# Patient Record
Sex: FEMALE
[De-identification: names, ages, dates, MRNs, and addresses within clinical notes are randomized per-mention and may not be internally consistent; named-entity substitution may affect disease eponyms.]

---

## 2017-08-01 ENCOUNTER — CHARTING TRANS (OUTPATIENT)
Dept: OTHER | Age: 27
End: 2017-08-01

## 2017-08-01 ENCOUNTER — LAB SERVICES (OUTPATIENT)
Dept: OTHER | Age: 27
End: 2017-08-01

## 2017-08-01 LAB
BILIRUBIN: NORMAL
LEUKOCYTES: NORMAL
NITRITE: NORMAL
OCCULT BLOOD: NORMAL
PH: 5
PROTEIN: 30
URINE SPEC GRAVITY: 1.02
UROBILINOGEN: 0.2

## 2018-11-03 VITALS
TEMPERATURE: 98.6 F | BODY MASS INDEX: 41.26 KG/M2 | HEART RATE: 82 BPM | OXYGEN SATURATION: 98 % | HEIGHT: 66 IN | DIASTOLIC BLOOD PRESSURE: 78 MMHG | WEIGHT: 256.72 LBS | SYSTOLIC BLOOD PRESSURE: 122 MMHG | RESPIRATION RATE: 18 BRPM

## 2019-01-07 ENCOUNTER — OFFICE VISIT (OUTPATIENT)
Dept: FAMILY MEDICINE CLINIC | Age: 29
End: 2019-01-07

## 2019-01-07 VITALS
SYSTOLIC BLOOD PRESSURE: 120 MMHG | WEIGHT: 239.4 LBS | HEART RATE: 81 BPM | BODY MASS INDEX: 38.47 KG/M2 | DIASTOLIC BLOOD PRESSURE: 77 MMHG | OXYGEN SATURATION: 100 % | HEIGHT: 66 IN | TEMPERATURE: 97.2 F | RESPIRATION RATE: 16 BRPM

## 2019-01-07 DIAGNOSIS — J30.89 NON-SEASONAL ALLERGIC RHINITIS DUE TO OTHER ALLERGIC TRIGGER: Primary | ICD-10-CM

## 2019-01-07 DIAGNOSIS — E66.9 OBESITY (BMI 35.0-39.9 WITHOUT COMORBIDITY): ICD-10-CM

## 2019-01-07 PROBLEM — E66.01 SEVERE OBESITY (HCC): Status: ACTIVE | Noted: 2019-01-07

## 2019-01-07 RX ORDER — CETIRIZINE HCL 10 MG
10 TABLET ORAL DAILY
Qty: 30 TAB | Refills: 3 | Status: SHIPPED | OUTPATIENT
Start: 2019-01-07 | End: 2019-04-08

## 2019-01-07 NOTE — PROGRESS NOTES
Chief Complaint   Patient presents with   [address] [place] [address]     1. Have you been to the ER, urgent care clinic since your last visit? Hospitalized since your last visit? No.    2. Have you seen or consulted any other health care providers outside of the [address] [place] [address] since your last visit? Include any pap smears or colon screening. No.     Last PAP was on May 29, 2018. PAP performed by Dr. Noris Marquez.     Visit Vitals  /77   Pulse 81   Temp 97.2 °F (36.2 °C) (Oral)   Resp 16   Ht 5' 6\" (1.676 m)   Wt 239 lb 6.4 oz (108.6 kg)   SpO2 100%   BMI 38.64 kg/m²

## 2019-01-07 NOTE — PATIENT INSTRUCTIONS
Allergies: Care Instructions  Your Care Instructions    Allergies occur when your body's defense system (immune system) overreacts to certain substances. The immune system treats a harmless substance as if it were a harmful germ or virus. Many things can cause this overreaction, including pollens, medicine, food, dust, animal dander, and mold. Allergies can be mild or severe. Mild allergies can be managed with home treatment. But medicine may be needed to prevent problems. Managing your allergies is an important part of staying healthy. Your doctor may suggest that you have allergy testing to help find out what is causing your allergies. When you know what things trigger your symptoms, you can avoid them. This can prevent allergy symptoms and other health problems. For severe allergies that cause reactions that affect your whole body (anaphylactic reactions), your doctor may prescribe a shot of epinephrine to carry with you in case you have a severe reaction. Learn how to give yourself the shot and keep it with you at all times. Make sure it is not . Follow-up care is a key part of your treatment and safety. Be sure to make and go to all appointments, and call your doctor if you are having problems. It's also a good idea to know your test results and keep a list of the medicines you take. How can you care for yourself at home? · If you have been told by your doctor that dust or dust mites are causing your allergy, decrease the dust around your bed:  ? Wash sheets, pillowcases, and other bedding in hot water every week. ? Use dust-proof covers for pillows, duvets, and mattresses. Avoid plastic covers because they tear easily and do not \"breathe. \" Wash as instructed on the label. ? Do not use any blankets and pillows that you do not need. ? Use blankets that you can wash in your washing machine. ? Consider removing drapes and carpets, which attract and hold dust, from your bedroom.   · If you are allergic to house dust and mites, do not use home humidifiers. Your doctor can suggest ways you can control dust and mites. · Look for signs of cockroaches. Cockroaches cause allergic reactions. Use cockroach baits to get rid of them. Then, clean your home well. Cockroaches like areas where grocery bags, newspapers, empty bottles, or cardboard boxes are stored. Do not keep these inside your home, and keep trash and food containers sealed. Seal off any spots where cockroaches might enter your home. · If you are allergic to mold, get rid of furniture, rugs, and drapes that smell musty. Check for mold in the bathroom. · If you are allergic to outdoor pollen or mold spores, use air-conditioning. Change or clean all filters every month. Keep windows closed. · If you are allergic to pollen, stay inside when pollen counts are high. Use a vacuum  with a HEPA filter or a double-thickness filter at least two times each week. · Stay inside when air pollution is bad. Avoid paint fumes, perfumes, and other strong odors. · Avoid conditions that make your allergies worse. Stay away from smoke. Do not smoke or let anyone else smoke in your house. Do not use fireplaces or wood-burning stoves. · If you are allergic to your pets, change the air filter in your furnace every month. Use high-efficiency filters. · If you are allergic to pet dander, keep pets outside or out of your bedroom. Old carpet and cloth furniture can hold a lot of animal dander. You may need to replace them. When should you call for help? Give an epinephrine shot if:    · You think you are having a severe allergic reaction.     · You have symptoms in more than one body area, such as mild nausea and an itchy mouth.    After giving an epinephrine shot call 911, even if you feel better.   Call 911 if:    · You have symptoms of a severe allergic reaction. These may include:  ? Sudden raised, red areas (hives) all over your body. ?  Swelling of the throat, mouth, lips, or tongue. ? Trouble breathing. ? Passing out (losing consciousness). Or you may feel very lightheaded or suddenly feel weak, confused, or restless.     · You have been given an epinephrine shot, even if you feel better.    Call your doctor now or seek immediate medical care if:    · You have symptoms of an allergic reaction, such as:  ? A rash or hives (raised, red areas on the skin). ? Itching. ? Swelling. ? Belly pain, nausea, or vomiting.    Watch closely for changes in your health, and be sure to contact your doctor if:    · You do not get better as expected. Where can you learn more? Go to http://moiz-shaun.info/. Enter F994 in the search box to learn more about \"Allergies: Care Instructions. \"  Current as of: June 28, 2018  Content Version: 11.8  © 9879-4440 [employer]. Care instructions adapted under license by [employer] (which disclaims liability or warranty for this information). If you have questions about a medical condition or this instruction, always ask your healthcare professional. [place] [address] any warranty or liability for your use of this information. Polycystic Ovary Syndrome: Care Instructions  Your Care Instructions  Polycystic ovary syndrome, or PCOS, means a woman's hormones are out of balance. It can cause problems with your periods and make it hard to get pregnant. Doctors don't know for sure what causes PCOS, but it seems to run in families. It also seems to be linked to obesity and a risk for diabetes. If you have PCOS, your sisters and daughters have a higher chance of getting it too. You may have other symptoms. These include weight gain, acne, too much hair growth on the face or body, high blood pressure, and high blood sugar. Your ovaries may have cysts on them. These cysts are growths filled with fluid.   Keep in mind that although you may not have regular periods, you can still get pregnant. Talk to your doctor about birth control if you do not want to get pregnant. Sometimes the hormone changes with PCOS can also make it hard for some women to get pregnant. If this is a concern, talk to your doctor about treatment for this problem. Women who have PCOS can go for months or longer with no period. Your doctor may recommend medicines that can help get your cycles back to normal.  Follow-up care is a key part of your treatment and safety. Be sure to make and go to all appointments, and call your doctor if you are having problems. It's also a good idea to know your test results and keep a list of the medicines you take. How can you care for yourself at home? · Take your medicines exactly as prescribed. Call your doctor if you think you are having a problem with your medicine. · Eat a healthy diet. Include fruits, vegetables, beans, and whole grains in your diet each day. · If you are overweight, losing weight can help with many of the symptoms of PCOS. Talk to your doctor about safe ways to lose weight. · Get at least 30 minutes of exercise on most days of the week. Walking is a good choice. Or you can run, swim, cycle, or play tennis or team sports. · For hair growth you don't want, try bleaching, plucking, electrolysis, or laser therapy. · Acne can be treated with over-the-counter medicines. Look for ones that have benzoyl peroxide or salicylic acid in them. When should you call for help? Call your doctor now or seek immediate medical care if:    · You have severe vaginal bleeding.     · You have new or worse belly or pelvic pain.    Watch closely for changes in your health, and be sure to contact your doctor if:    · You do not get better as expected.     · You have unusual vaginal bleeding. Where can you learn more? Go to http://moiz-shaun.info/. Enter C831 in the search box to learn more about \"Polycystic Ovary Syndrome: Care Instructions. \"  Current as of: May 15, 2018  Content Version: 11.8  © 9816-3272 [employer]. Care instructions adapted under license by [employer] (which disclaims liability or warranty for this information). If you have questions about a medical condition or this instruction, always ask your healthcare professional. [address] any warranty or liability for your use of this information.

## 2019-01-07 NOTE — PROGRESS NOTES
Chief Complaint   Patient presents with    Establish Care       HPI:    This is a  33 y/o female  patient who presents to [facility] . Today she complain of itching watery eye, running nose and occasional cough. Patient concerned about weight. She is working on loosing weight. BMI >38    ROS:  Pt denies: Wt loss, Fever/Chills, HA, Visual changes, Fatigue, Chest pain, SOB, FREEDMAN, Abd pain, N/V/D/C, Blood in stool or urine, Edema. Pertinent positive as above in HPI. All others were negative          Past Medical History:   Diagnosis Date    Anxiety     Depression     Herpes simplex type 1 infection      Past Surgical History:   Procedure Laterality Date    HX HERNIA REPAIR      HX WISDOM TEETH EXTRACTION         Family History   Problem Relation Age of Onset    Hypertension Mother    Golden.Alisa Cataract Mother     Schizophrenia Mother     Diabetes Father    Golden.Alisa Elevated Lipids Father     Hypertension Father     Cancer Paternal Grandmother         stomach    Diabetes Sister        Social History     Socioeconomic History    Marital status: SINGLE     Spouse name: Not on file    Number of children: Not on file    Years of education: Not on file    Highest education level: Not on file   Tobacco Use    Smoking status: Never Smoker    Smokeless tobacco: Never Used   Substance and Sexual Activity    Alcohol use: Yes     Comment: Social    Drug use: No    Sexual activity: Yes     Partners: Male     Current Outpatient Medications   Medication Sig Dispense Refill    ergocalciferol (ERGOCALCIFEROL) 50,000 unit capsule Take 1 Cap by mouth every seven (7) days. 12 Cap 1    cetirizine (ZYRTEC) 10 mg tablet Take 1 Tab by mouth daily.  30 Tab 3       No Known Allergies          Physical Exam:    Vital Signs:   Visit Vitals  /77   Pulse 81   Temp 97.2 °F (36.2 °C) (Oral)   Resp 16   Ht 5' 6\" (1.676 m)   Wt 239 lb 6.4 oz (108.6 kg)   LMP 01/01/2019 (Exact Date)   SpO2 100%   BMI 38.64 kg/m²         General: a, a & o x 3, afebrile,  interacting appropriately, in no acute distress  HEENT: head normocephalic and atraumatic, conjuctiva clear  Skin: warm and dry, no rashes , no bruises, no skin lesions  Neck: supple, symmetrical, no palpable mass, no thyromegaly  Respiratory: lung sounds clear bilaterally, good air entry, good respiratory effort, no wheezes or crackles  Cardiovascular: normal S1S2, regular rate and rhythm, no murmurs  Abdomen: non-distended, normoactive bowel sounds x 4 quadrants, soft, non-tender to palpation, no guarding or rigidity, no hepatomegaly, no palpable mass  Musculoskeletal: normal ROM on all joints, no swelling or deformity  Psychiatric: a, a & o x 3, appropriate mood and affect, no thought disorder    Assessment/Plan:      ICD-10-CM ICD-9-CM    1. Non-seasonal allergic rhinitis due to other allergic trigger J30.89 477.8 cetirizine (ZYRTEC) 10 mg tablet      REFERRAL TO ALLERGY   2. Obesity (BMI 35.0-39.9 without comorbidity) E66.9 278.00 CBC WITH AUTOMATED DIFF    I have reviewed/discussed the above normal BMI with the patient. I have recommended the following interventions: dietary management education, guidance, and counseling, encourage exercise and monitor weight . Annamary Ripa TSH 3RD GENERATION      LIPID PANEL      VITAMIN D, 25 HYDROXY      METABOLIC PANEL, COMPREHENSIVE      T4, FREE      HEMOGLOBIN A1C WITH EAG      HEMOGLOBIN A1C WITH EAG      T4, FREE      METABOLIC PANEL, COMPREHENSIVE      VITAMIN D, 25 HYDROXY      LIPID PANEL      TSH 3RD GENERATION      CBC WITH AUTOMATED DIFF           Additional Notes: Discussed today's diagnosis, treatment plans. Discussed medication indications and side effects. After Visit Summary: Provided and discussed printed patient instructions. Answered questions in relation to today's diagnosis.   Follow-up Disposition: 2 weeks follow up weight and lab review        Maryanne Rodas, NP-BC  [address] [facility] [place]            Orders Placed This Encounter    CBC WITH AUTOMATED DIFF    TSH 3RD GENERATION    LIPID PANEL    VITAMIN D, 25 HYDROXY    METABOLIC PANEL, COMPREHENSIVE    T4, FREE    HEMOGLOBIN A1C WITH EAG    CVD REPORT    REFERRAL TO ALLERGY    cetirizine (ZYRTEC) 10 mg tablet    ergocalciferol (ERGOCALCIFEROL) 50,000 unit capsule

## 2019-01-08 LAB
25(OH)D3+25(OH)D2 SERPL-MCNC: 13.9 NG/ML (ref 30–100)
ALBUMIN SERPL-MCNC: 4.6 G/DL (ref 3.5–5.5)
ALBUMIN/GLOB SERPL: 1.6 {RATIO} (ref 1.2–2.2)
ALP SERPL-CCNC: 104 IU/L (ref 39–117)
ALT SERPL-CCNC: 12 IU/L (ref 0–32)
AST SERPL-CCNC: 12 IU/L (ref 0–40)
BASOPHILS # BLD AUTO: 0 X10E3/UL (ref 0–0.2)
BASOPHILS NFR BLD AUTO: 0 %
BILIRUB SERPL-MCNC: <0.2 MG/DL (ref 0–1.2)
BUN SERPL-MCNC: 12 MG/DL (ref 6–20)
BUN/CREAT SERPL: 17 (ref 9–23)
CALCIUM SERPL-MCNC: 8.9 MG/DL (ref 8.7–10.2)
CHLORIDE SERPL-SCNC: 105 MMOL/L (ref 96–106)
CHOLEST SERPL-MCNC: 206 MG/DL (ref 100–199)
CO2 SERPL-SCNC: 19 MMOL/L (ref 20–29)
CREAT SERPL-MCNC: 0.69 MG/DL (ref 0.57–1)
EOSINOPHIL # BLD AUTO: 0.1 X10E3/UL (ref 0–0.4)
EOSINOPHIL NFR BLD AUTO: 1 %
ERYTHROCYTE [DISTWIDTH] IN BLOOD BY AUTOMATED COUNT: 14.8 % (ref 12.3–15.4)
EST. AVERAGE GLUCOSE BLD GHB EST-MCNC: 108 MG/DL
GLOBULIN SER CALC-MCNC: 2.8 G/DL (ref 1.5–4.5)
GLUCOSE SERPL-MCNC: 88 MG/DL (ref 65–99)
HBA1C MFR BLD: 5.4 % (ref 4.8–5.6)
HCT VFR BLD AUTO: 40.6 % (ref 34–46.6)
HDLC SERPL-MCNC: 40 MG/DL
HGB BLD-MCNC: 12.9 G/DL (ref 11.1–15.9)
IMM GRANULOCYTES # BLD AUTO: 0 X10E3/UL (ref 0–0.1)
IMM GRANULOCYTES NFR BLD AUTO: 0 %
INTERPRETATION, 910389: NORMAL
LDLC SERPL CALC-MCNC: 134 MG/DL (ref 0–99)
LYMPHOCYTES # BLD AUTO: 2.3 X10E3/UL (ref 0.7–3.1)
LYMPHOCYTES NFR BLD AUTO: 32 %
MCH RBC QN AUTO: 27.7 PG (ref 26.6–33)
MCHC RBC AUTO-ENTMCNC: 31.8 G/DL (ref 31.5–35.7)
MCV RBC AUTO: 87 FL (ref 79–97)
MONOCYTES # BLD AUTO: 0.3 X10E3/UL (ref 0.1–0.9)
MONOCYTES NFR BLD AUTO: 4 %
NEUTROPHILS # BLD AUTO: 4.5 X10E3/UL (ref 1.4–7)
NEUTROPHILS NFR BLD AUTO: 63 %
PLATELET # BLD AUTO: 381 X10E3/UL (ref 150–379)
POTASSIUM SERPL-SCNC: 4.5 MMOL/L (ref 3.5–5.2)
PROT SERPL-MCNC: 7.4 G/DL (ref 6–8.5)
RBC # BLD AUTO: 4.65 X10E6/UL (ref 3.77–5.28)
SODIUM SERPL-SCNC: 140 MMOL/L (ref 134–144)
T4 FREE SERPL-MCNC: 0.99 NG/DL (ref 0.82–1.77)
TRIGL SERPL-MCNC: 159 MG/DL (ref 0–149)
TSH SERPL DL<=0.005 MIU/L-ACNC: 0.86 UIU/ML (ref 0.45–4.5)
VLDLC SERPL CALC-MCNC: 32 MG/DL (ref 5–40)
WBC # BLD AUTO: 7.2 X10E3/UL (ref 3.4–10.8)

## 2019-01-17 RX ORDER — ERGOCALCIFEROL 1.25 MG/1
50000 CAPSULE ORAL
Qty: 12 CAP | Refills: 1 | Status: SHIPPED | OUTPATIENT
Start: 2019-01-17 | End: 2019-11-27 | Stop reason: DRUGHIGH

## 2019-01-21 ENCOUNTER — OFFICE VISIT (OUTPATIENT)
Dept: FAMILY MEDICINE CLINIC | Age: 29
End: 2019-01-21

## 2019-01-21 VITALS
TEMPERATURE: 97.5 F | RESPIRATION RATE: 16 BRPM | HEIGHT: 66 IN | HEART RATE: 82 BPM | WEIGHT: 241.4 LBS | DIASTOLIC BLOOD PRESSURE: 72 MMHG | SYSTOLIC BLOOD PRESSURE: 115 MMHG | BODY MASS INDEX: 38.8 KG/M2

## 2019-01-21 DIAGNOSIS — E55.9 VITAMIN D DEFICIENCY: ICD-10-CM

## 2019-01-21 DIAGNOSIS — E66.9 OBESITY (BMI 35.0-39.9 WITHOUT COMORBIDITY): ICD-10-CM

## 2019-01-21 DIAGNOSIS — E78.2 MIXED HYPERLIPIDEMIA: Primary | ICD-10-CM

## 2019-01-21 DIAGNOSIS — J30.89 NON-SEASONAL ALLERGIC RHINITIS DUE TO OTHER ALLERGIC TRIGGER: ICD-10-CM

## 2019-01-21 NOTE — PROGRESS NOTES
Chief Complaint   Patient presents with   [facility]     lab results on 1-7-2019       HPI:    This is a  35 y/o female  patient who presents presents for follow up allergy symptoms, weight concerns and lab review. Patient states zyrtec is not helping. She decline to use Flonase. Have appointment scheduled with allergist next week. Patient concerned about weight. She is working on loosing weight. BMI >38  2 Ib weight gain since last visit    Recent lab reviewed with patient. Vit D low and Lipids elevated     Results for orders placed or performed in visit on 01/07/19   HEMOGLOBIN A1C WITH EAG   Result Value Ref Range    Hemoglobin A1c 5.4 4.8 - 5.6 %    Estimated average glucose 108 mg/dL   T4, FREE   Result Value Ref Range    T4, Free 0.99 0.82 - 9.87 ng/dL   METABOLIC PANEL, COMPREHENSIVE   Result Value Ref Range    Glucose 88 65 - 99 mg/dL    BUN 12 6 - 20 mg/dL    Creatinine 0.69 0.57 - 1.00 mg/dL    GFR est non- >59 mL/min/1.73    GFR est  >59 mL/min/1.73    BUN/Creatinine ratio 17 9 - 23    Sodium 140 134 - 144 mmol/L    Potassium 4.5 3.5 - 5.2 mmol/L    Chloride 105 96 - 106 mmol/L    CO2 19 (L) 20 - 29 mmol/L    Calcium 8.9 8.7 - 10.2 mg/dL    Protein, total 7.4 6.0 - 8.5 g/dL    Albumin 4.6 3.5 - 5.5 g/dL    GLOBULIN, TOTAL 2.8 1.5 - 4.5 g/dL    A-G Ratio 1.6 1.2 - 2.2    Bilirubin, total <0.2 0.0 - 1.2 mg/dL    Alk.  phosphatase 104 39 - 117 IU/L    AST (SGOT) 12 0 - 40 IU/L    ALT (SGPT) 12 0 - 32 IU/L   VITAMIN D, 25 HYDROXY   Result Value Ref Range    VITAMIN D, 25-HYDROXY 13.9 (L) 30.0 - 100.0 ng/mL   LIPID PANEL   Result Value Ref Range    Cholesterol, total 206 (H) 100 - 199 mg/dL    Triglyceride 159 (H) 0 - 149 mg/dL    HDL Cholesterol 40 >39 mg/dL    VLDL, calculated 32 5 - 40 mg/dL    LDL, calculated 134 (H) 0 - 99 mg/dL   TSH 3RD GENERATION   Result Value Ref Range    TSH 0.857 0.450 - 4.500 uIU/mL   CBC WITH AUTOMATED DIFF   Result Value Ref Range    WBC 7.2 3.4 - 10.8 x10E3/uL RBC 4.65 3.77 - 5.28 x10E6/uL    HGB 12.9 11.1 - 15.9 g/dL    HCT 40.6 34.0 - 46.6 %    MCV 87 79 - 97 fL    MCH 27.7 26.6 - 33.0 pg    MCHC 31.8 31.5 - 35.7 g/dL    RDW 14.8 12.3 - 15.4 %    PLATELET 098 (H) 007 - 379 x10E3/uL    NEUTROPHILS 63 Not Estab. %    Lymphocytes 32 Not Estab. %    MONOCYTES 4 Not Estab. %    EOSINOPHILS 1 Not Estab. %    BASOPHILS 0 Not Estab. %    ABS. NEUTROPHILS 4.5 1.4 - 7.0 x10E3/uL    Abs Lymphocytes 2.3 0.7 - 3.1 x10E3/uL    ABS. MONOCYTES 0.3 0.1 - 0.9 x10E3/uL    ABS. EOSINOPHILS 0.1 0.0 - 0.4 x10E3/uL    ABS. BASOPHILS 0.0 0.0 - 0.2 x10E3/uL    IMMATURE GRANULOCYTES 0 Not Estab. %    ABS. IMM. GRANS. 0.0 0.0 - 0.1 x10E3/uL   CVD REPORT   Result Value Ref Range    INTERPRETATION Note          ROS:   Pertinent positive as above in HPI. All others were negative          Past Medical History:   Diagnosis Date    Anxiety     Depression     Herpes simplex type 1 infection        Social History     Socioeconomic History    Marital status: SINGLE     Spouse name: Not on file    Number of children: Not on file    Years of education: Not on file    Highest education level: Not on file   Tobacco Use    Smoking status: Never Smoker    Smokeless tobacco: Never Used   Substance and Sexual Activity    Alcohol use: Yes     Comment: Social    Drug use: No    Sexual activity: Yes     Partners: Male     Current Outpatient Medications   Medication Sig Dispense Refill    ergocalciferol (ERGOCALCIFEROL) 50,000 unit capsule Take 1 Cap by mouth every seven (7) days. 12 Cap 1    cetirizine (ZYRTEC) 10 mg tablet Take 1 Tab by mouth daily.  30 Tab 3       No Known Allergies          Physical Exam:    Vital Signs:   Visit Vitals  /72   Pulse 82   Temp 97.5 °F (36.4 °C) (Oral)   Resp 16   Ht 5' 6\" (1.676 m)   Wt 241 lb 6.4 oz (109.5 kg)   LMP 01/01/2019 (Exact Date)   BMI 38.96 kg/m²         General: a, a & o x 3, afebrile,  interacting appropriately, in no acute distress  HEENT: head normocephalic and atraumatic, conjuctiva clear  Skin: warm and dry, no rashes , no bruises, no skin lesions  Neck: supple, symmetrical, no palpable mass, no thyromegaly  Respiratory: lung sounds clear bilaterally, good air entry, good respiratory effort, no wheezes or crackles  Cardiovascular: normal S1S2, regular rate and rhythm, no murmurs      Assessment/Plan:      ICD-10-CM ICD-9-CM    1. Mixed hyperlipidemia E78.2 272.2 Manage with diet and exercise   2. Non-seasonal allergic rhinitis due to other allergic trigger J30.89 477.8 Referral to allergist pending . Take Zyrtec as prescribed   3. Obesity (BMI 35.0-39.9 without comorbidity) E66.9 278.00 Low fat, low carb diet with portion control and exercise advised   4. Vitamin D deficiency E55.9 268.9 Take vit d once a week as prescribed           Additional Notes: Discussed today's diagnosis, treatment plans. Discussed medication indications and side effects. After Visit Summary: Provided and discussed printed patient instructions. Answered questions in relation to today's diagnosis.   Follow-up Disposition:  6 month                KAYLIN Mascorro  [address] [place] [address],[address]

## 2019-01-21 NOTE — PROGRESS NOTES
1. Have you been to the ER, urgent care clinic since your last visit? Hospitalized since your last visit? No    2. Have you seen or consulted any other health care providers outside of the [address] [place] [address] since your last visit? Include any pap smears or colon screening.  No     Chief Complaint   Patient presents with   [facility]     lab results on 1-7-2019

## 2019-01-21 NOTE — PATIENT INSTRUCTIONS

## 2019-04-08 ENCOUNTER — OFFICE VISIT (OUTPATIENT)
Dept: FAMILY MEDICINE CLINIC | Age: 29
End: 2019-04-08

## 2019-04-08 VITALS
DIASTOLIC BLOOD PRESSURE: 71 MMHG | TEMPERATURE: 97.4 F | SYSTOLIC BLOOD PRESSURE: 109 MMHG | OXYGEN SATURATION: 98 % | HEIGHT: 66 IN | WEIGHT: 244.2 LBS | RESPIRATION RATE: 16 BRPM | HEART RATE: 80 BPM | BODY MASS INDEX: 39.25 KG/M2

## 2019-04-08 DIAGNOSIS — A60.04 HERPES SIMPLEX VULVOVAGINITIS: ICD-10-CM

## 2019-04-08 DIAGNOSIS — J30.89 NON-SEASONAL ALLERGIC RHINITIS DUE TO OTHER ALLERGIC TRIGGER: ICD-10-CM

## 2019-04-08 DIAGNOSIS — Z00.00 WELL WOMAN EXAM (NO GYNECOLOGICAL EXAM): Primary | ICD-10-CM

## 2019-04-08 DIAGNOSIS — E66.01 SEVERE OBESITY (HCC): ICD-10-CM

## 2019-04-08 PROBLEM — Z83.3 FAMILY HISTORY OF DIABETES MELLITUS: Status: ACTIVE | Noted: 2019-04-08

## 2019-04-08 PROBLEM — A60.00 GENITAL HERPES SIMPLEX: Status: ACTIVE | Noted: 2019-04-08

## 2019-04-08 PROBLEM — L68.0 HIRSUTISM: Status: ACTIVE | Noted: 2019-04-08

## 2019-04-08 RX ORDER — VALACYCLOVIR HYDROCHLORIDE 500 MG/1
500 TABLET, FILM COATED ORAL 2 TIMES DAILY
Qty: 30 TAB | Refills: 3 | Status: SHIPPED | OUTPATIENT
Start: 2019-04-08 | End: 2020-08-25 | Stop reason: SDUPTHER

## 2019-04-08 RX ORDER — MINERAL OIL
180 ENEMA (ML) RECTAL DAILY
Qty: 30 TAB | Refills: 2 | Status: SHIPPED | OUTPATIENT
Start: 2019-04-08 | End: 2019-09-04 | Stop reason: SDUPTHER

## 2019-04-08 RX ORDER — FLUTICASONE PROPIONATE 50 MCG
2 SPRAY, SUSPENSION (ML) NASAL DAILY
COMMUNITY
End: 2019-04-08 | Stop reason: SDUPTHER

## 2019-04-08 RX ORDER — FLUTICASONE PROPIONATE 50 MCG
2 SPRAY, SUSPENSION (ML) NASAL DAILY
Qty: 1 BOTTLE | Refills: 2 | Status: SHIPPED | OUTPATIENT
Start: 2019-04-08

## 2019-04-08 NOTE — PROGRESS NOTES
1. Have you been to the ER, urgent care clinic since your last visit? Hospitalized since your last visit? No 
 
2. Have you seen or consulted any other health care providers outside of the [facility] since your last visit? Include any pap smears or colon screening. No  
 
Chief Complaint Patient presents with  Medication Refill Guido Hernandez Establish Care Visit Vitals /71 (BP 1 Location: Left arm, BP Patient Position: At rest) Pulse 80 Temp 97.4 °F (36.3 °C) (Oral) Resp 16 Ht 5' 6\" (1.676 m) Wt 244 lb 3.2 oz (110.8 kg) LMP 03/28/2019 SpO2 98% BMI 39.41 kg/m²

## 2019-04-08 NOTE — PROGRESS NOTES
Malik Vaughn [address] 
             [phone] Subjective:  
34 y.o. female for Well Woman Check. Her gyne and breast care is done elsewhere by her Ob-Gyne physician. Patient Active Problem List  
Diagnosis Code  Severe obesity ([facility]) E66.01  
 Family history of diabetes mellitus Z83.3  Genital herpes simplex A60.00  
 Hirsutism L68.0  Low grade squamous intraepithelial lesion (LGSIL) on cervicovaginal cytologic smear R87.612, R87.622 Patient Active Problem List  
 Diagnosis Date Noted  Family history of diabetes mellitus 04/08/2019  Genital herpes simplex 04/08/2019  Hirsutism 04/08/2019  Severe obesity ([facility] [address] 01/07/2019  Low grade squamous intraepithelial lesion (LGSIL) on cervicovaginal cytologic smear 06/30/2014 Current Outpatient Medications Medication Sig Dispense Refill  valACYclovir (VALTREX) 500 mg tablet Take 1 Tab by mouth two (2) times a day. 30 Tab 3  
 fexofenadine (ALLEGRA) 180 mg tablet Take 1 Tab by mouth daily. 30 Tab 2  
 fluticasone propionate (FLONASE) 50 mcg/actuation nasal spray 2 Sprays by Both Nostrils route daily. 1 Bottle 2  
 ergocalciferol (ERGOCALCIFEROL) 50,000 unit capsule Take 1 Cap by mouth every seven (7) days. 12 Cap 1  copper (PARAGARD T 380A) 380 square mm IUD ParaGard T 380A Allergies Allergen Reactions  Pollen Extracts Runny Nose Past Medical History:  
Diagnosis Date  Anxiety  Depression  Herpes simplex type 1 infection Past Surgical History:  
Procedure Laterality Date  HX HERNIA REPAIR    
 HX WISDOM TEETH EXTRACTION Family History Problem Relation Age of Onset  Hypertension Mother [facility] Cataract Mother [facility] Schizophrenia Mother  Diabetes Father  Elevated Lipids Father  Hypertension Father  Cancer Paternal Grandmother   
     stomach  Diabetes Sister Social History Tobacco Use  
  Smoking status: Never Smoker  Smokeless tobacco: Never Used Substance Use Topics  Alcohol use: Yes Comment: Social  
  
 
  
 
ROS:  
Review of Systems Constitutional: Negative. HENT: Negative. Last Dental exam: 12/2010 Allergies with nasal congestion Eyes: Negative. Last eye exam:  1/2019 Wears corrective lenses Respiratory: Negative. Cardiovascular: Negative. Gastrointestinal: Negative. Genitourinary: Negative. No problems with genitalia Recent visit to GYN, labs revealed low prolactin levels, she was going to have a MRI as there was concern for pituitary tumor. However, after further discussion with the MD the decision was made to monitor for now Musculoskeletal: Positive for joint pain. Is a , wrist and right elbow pain Skin: Negative. Micro bumps to bilat arms, seeing allergist, itchy Neurological: Negative. Endo/Heme/Allergies: Negative for polydipsia. Psychiatric/Behavioral: Positive for depression. The patient is nervous/anxious. Not currently on medications, wotrkig well for her, is seeing a therapist soon Specific concerns today: med reorders Objective: The patient appears well, alert, oriented x 3, in no distress. Visit Vitals /71 (BP 1 Location: Left arm, BP Patient Position: At rest) Pulse 80 Temp 97.4 °F (36.3 °C) (Oral) Resp 16 Ht 5' 6\" (1.676 m) Wt 244 lb 3.2 oz (110.8 kg) LMP 03/28/2019 SpO2 98% BMI 39.41 kg/m² Physical Exam  
Constitutional: She is oriented to person, place, and time and well-developed, well-nourished, and in no distress. Vital signs are normal.  
HENT:  
Head: Normocephalic and atraumatic.   
Right Ear: Hearing, tympanic membrane, external ear and ear canal normal.  
Left Ear: Hearing, tympanic membrane, external ear and ear canal normal.  
Nose: Nose normal.  
Mouth/Throat: Uvula is midline, oropharynx is clear and moist and mucous membranes are normal.  
Eyes: Pupils are equal, round, and reactive to light. Conjunctivae, EOM and lids are normal.  
Neck: Trachea normal and normal range of motion. Neck supple. No JVD present. Carotid bruit is not present. No tracheal deviation present. No thyroid mass and no thyromegaly present. Cardiovascular: Normal rate, regular rhythm, S1 normal, S2 normal, normal heart sounds and intact distal pulses. Pulmonary/Chest: Effort normal and breath sounds normal.  
Abdominal: Soft. Normal appearance and bowel sounds are normal. There is no tenderness. There is no CVA tenderness. Musculoskeletal: Normal range of motion. Lymphadenopathy:  
     Head (right side): No submental, no submandibular, no tonsillar, no preauricular, no posterior auricular and no occipital adenopathy present. Head (left side): No submental, no submandibular, no tonsillar, no preauricular, no posterior auricular and no occipital adenopathy present. She has no cervical adenopathy. Neurological: She is alert and oriented to person, place, and time. She has normal motor skills. Gait normal.  
Skin: Skin is warm and dry. Psychiatric: Mood, memory, affect and judgment normal.  
Nursing note and vitals reviewed. Assessment/Plan:  
Well Woman 
lose weight, increase physical activity, limit alcohol consumption, follow low fat diet, follow low salt diet, routine labs ordered, call if any problems ICD-10-CM ICD-9-CM 1. Well woman exam (no gynecological exam) Z00.00 V70.0 Here to establish care and get medication refills [V70.0] 2. Severe obesity ([facility] [address] E66.01 278.01  Discussed the patient's BMI with him. The BMI follow up plan is as follows:  
 
dietary management education, guidance, and counseling 
encourage exercise 
monitor weight 
prescribed dietary intake Discussed portion control Eat fresh or frozen fruits and vegetables, stay away from canned Limit eating out and fast food Practice meal planning 3. Herpes simplex vulvovaginitis A60.04 054.11 valACYclovir (VALTREX) 500 mg tablet Stable, valtrex as needed 4. Non-seasonal allergic rhinitis due to other allergic trigger J30.89 477.8 fexofenadine (ALLEGRA) 180 mg tablet Stable with use of allegra and flonase  
   fluticasone propionate (FLONASE) 50 mcg/actuation nasal spray An After Visit Summary was printed and given to the patient. All diagnosis have been discussed with the patient and all of the patient's questions have been answered. Follow-up and Dispositions · Return in about 6 months (around 10/8/2019) for chronic conditions, labs f/u , 15 minutes. Oz Miller, AGNP-BC [facility], MarcoFormerly [facility]ious[facility]

## 2019-04-08 NOTE — PATIENT INSTRUCTIONS
Learning About Low-Carbohydrate Diets for Weight Loss What is a low-carbohydrate diet? Low-carb diets avoid foods that are high in carbohydrate. These high-carb foods include pasta, bread, rice, cereal, fruits, and starchy vegetables. Instead, these diets usually have you eat foods that are high in fat and protein. Many people lose weight quickly on a low-carb diet. But the early weight loss is water. People on this diet often gain the weight back after they start eating carbs again. Not all diet plans are safe or work well. A lot of the evidence shows that low-carb diets aren't healthy. That's because these diets often don't include healthy foods like fruits and vegetables. Losing weight safely means balancing protein, fat, and carbs with every meal and snack. And low-carb diets don't always provide the vitamins, minerals, and fiber you need. If you have a serious medical condition, talk to your doctor before you try any diet. These conditions include kidney disease, heart disease, type 2 diabetes, high cholesterol, and high blood pressure. If you are pregnant, it may not be safe for your baby if you are on a low-carb diet. How can you lose weight safely? You might have heard that a diet plan helped another person lose weight. But that doesn't mean that it will work for you. It is very hard to stay on a diet that includes lots of big changes in your eating habits. If you want to get to a healthy weight and stay there, making healthy lifestyle changes will often work better than dieting. These steps can help. · Make a plan for change. Work with your doctor to create a plan that is right for you. · See a dietitian. He or she can show you how to make healthy changes in your eating habits. · Manage stress. If you have a lot of stress in your life, it can be hard to focus on making healthy changes to your daily habits. · Track your food and activity.  You are likely to do better at losing weight if you keep track of what you eat and what you do. Follow-up care is a key part of your treatment and safety. Be sure to make and go to all appointments, and call your doctor if you are having problems. It's also a good idea to know your test results and keep a list of the medicines you take. Where can you learn more? Go to http://moiz-shaun.info/. Enter A121 in the search box to learn more about \"Learning About Low-Carbohydrate Diets for Weight Loss. \" Current as of: March 28, 2018 Content Version: 11.9 © 5582-7920 [employer]. Care instructions adapted under license by [employer] (which disclaims liability or warranty for this information). If you have questions about a medical condition or this instruction, always ask your healthcare professional. [address] any warranty or liability for your use of this information.

## 2019-09-04 DIAGNOSIS — J30.89 NON-SEASONAL ALLERGIC RHINITIS DUE TO OTHER ALLERGIC TRIGGER: ICD-10-CM

## 2019-09-05 RX ORDER — MINERAL OIL
ENEMA (ML) RECTAL
Qty: 30 TAB | Refills: 0 | Status: SHIPPED | OUTPATIENT
Start: 2019-09-05 | End: 2019-10-27 | Stop reason: SDUPTHER

## 2019-10-27 DIAGNOSIS — J30.89 NON-SEASONAL ALLERGIC RHINITIS DUE TO OTHER ALLERGIC TRIGGER: ICD-10-CM

## 2019-10-27 RX ORDER — MINERAL OIL
ENEMA (ML) RECTAL
Qty: 30 TAB | Refills: 0 | Status: SHIPPED | OUTPATIENT
Start: 2019-10-27 | End: 2019-11-18

## 2019-10-28 DIAGNOSIS — J30.89 NON-SEASONAL ALLERGIC RHINITIS DUE TO OTHER ALLERGIC TRIGGER: ICD-10-CM

## 2019-10-28 RX ORDER — MINERAL OIL
ENEMA (ML) RECTAL
Qty: 30 TAB | Refills: 0 | Status: SHIPPED | OUTPATIENT
Start: 2019-10-28 | End: 2019-11-18 | Stop reason: SDUPTHER

## 2019-11-18 ENCOUNTER — OFFICE VISIT (OUTPATIENT)
Dept: FAMILY MEDICINE CLINIC | Age: 29
End: 2019-11-18

## 2019-11-18 VITALS
HEIGHT: 66 IN | TEMPERATURE: 96.7 F | DIASTOLIC BLOOD PRESSURE: 68 MMHG | BODY MASS INDEX: 40.82 KG/M2 | OXYGEN SATURATION: 98 % | RESPIRATION RATE: 14 BRPM | SYSTOLIC BLOOD PRESSURE: 127 MMHG | HEART RATE: 85 BPM | WEIGHT: 254 LBS

## 2019-11-18 DIAGNOSIS — J30.89 NON-SEASONAL ALLERGIC RHINITIS DUE TO OTHER ALLERGIC TRIGGER: ICD-10-CM

## 2019-11-18 DIAGNOSIS — E78.2 MIXED HYPERLIPIDEMIA: ICD-10-CM

## 2019-11-18 DIAGNOSIS — E55.9 VITAMIN D DEFICIENCY: ICD-10-CM

## 2019-11-18 DIAGNOSIS — E66.01 SEVERE OBESITY (HCC): Primary | ICD-10-CM

## 2019-11-18 RX ORDER — LEVOCETIRIZINE DIHYDROCHLORIDE 5 MG/1
5 TABLET, FILM COATED ORAL DAILY
Qty: 90 TAB | Refills: 3 | Status: SHIPPED | OUTPATIENT
Start: 2019-11-18 | End: 2020-08-25 | Stop reason: SDUPTHER

## 2019-11-18 NOTE — PROGRESS NOTES
Chief Complaint   Patient presents with    Cholesterol Problem    Other     Wants to discuss possibly receiving allergy injections            1. Have you been to the ER, urgent care clinic since your last visit? Hospitalized since your last visit? pt first x 2 weeks sinus infection     2. Have you seen or consulted any other health care providers outside of the [address] [place] [address] since your last visit? Include any pap smears or colon screening.  No

## 2019-11-18 NOTE — PROGRESS NOTES
Bennie               Malik Dias               [phone]      Jenny Boyd is a 34 y.o. female and presents with Cholesterol Problem and Other (Wants to discuss possibly receiving allergy injections )       Assessment/Plan:    Diagnoses and all orders for this visit:    1. Severe obesity ([address]  Discussed the patient's BMI. The BMI follow up plan is as follows:     dietary management education, guidance, and counseling  encourage exercise  monitor weight  prescribed dietary intake  Discussed portion control  Eat fresh or frozen fruits and vegetables, stay away from canned  Limit eating out and fast food  Practice meal planning    2. Mixed hyperlipidemia  -     LIPID PANEL; Future  On last lab check her total and LDL cholesterol was elevated  We will recheck today and evaluate for needs of medication treatments    3. Non-seasonal allergic rhinitis due to other allergic trigger  -     levocetirizine (XYZAL) 5 mg tablet; Take 1 Tab by mouth daily. Will try Xyzal instead of Allegra to see if she gets better relief from her allergy symptoms  She question if I could give her a shot instead of the medications, informed her that she would need to see an allergist as all the shots are different  She stated she has looked into this but the co-pay at this time is too expensive for her, states she will continue with current treatment prescribed    4. Vitamin D deficiency  -     VITAMIN D, 25 HYDROXY; Future  Last time her vitamin D was checked it was 15, will recheck today    This note was dictated using Dragon dictation system. Every effort was made to ensure accuracy, although occasional spelling errors and word substitutions are expected due to dictation system limitations. Thank you for your understanding and patience. Follow-up and Dispositions    · Return in about 1 year (around 11/18/2020) for Annual physical, w/o gyn. Subjective:     Allergies  Would like to see an allergist but the payments are too high  Not using flonase because it causes post nasal drip  Would like something that is not a sprsy    ROS:     Review of Systems   Constitutional: Negative. HENT:        Chronic sinus congestion, when using Flonase develops postnasal drip which makes her throat sore and irritated, therefore she does not use the Flonase, questioning if something is available as a drop as opposed to the heart spray that Flonase causes   Eyes: Negative. Respiratory: Negative. Cardiovascular: Negative. Psychiatric/Behavioral: Negative. The problem list was updated as a part of today's visit. Patient Active Problem List   Diagnosis Code    Severe obesity ([facility] [address] E66.01    Family history of diabetes mellitus Z83.3    Genital herpes simplex A60.00    Hirsutism L68.0    Low grade squamous intraepithelial lesion (LGSIL) on cervicovaginal cytologic smear R87.612, R87.622    Mixed hyperlipidemia E78.2       The PSH, FH were reviewed. SH:  Social History     Tobacco Use    Smoking status: Never Smoker    Smokeless tobacco: Never Used   Substance Use Topics    Alcohol use: Yes     Comment: Social    Drug use: No       Medications/Allergies:  Current Outpatient Medications on File Prior to Visit   Medication Sig Dispense Refill    copper (PARAGARD T 380A) 380 square mm IUD ParaGard T 380A      valACYclovir (VALTREX) 500 mg tablet Take 1 Tab by mouth two (2) times a day. 30 Tab 3    fluticasone propionate (FLONASE) 50 mcg/actuation nasal spray 2 Sprays by Both Nostrils route daily. 1 Bottle 2    [DISCONTINUED] fexofenadine (ALLEGRA) 180 mg tablet TAKE 1 TABLET BY MOUTH DAILY 30 Tab 0    [DISCONTINUED] fexofenadine (ALLEGRA) 180 mg tablet TAKE 1 TABLET BY MOUTH DAILY 30 Tab 0    ergocalciferol (ERGOCALCIFEROL) 50,000 unit capsule Take 1 Cap by mouth every seven (7) days. 12 Cap 1     No current facility-administered medications on file prior to visit.          Allergies Allergen Reactions    Pollen Extracts Runny Nose       Objective:  Visit Vitals  /68   Pulse 85   Temp 96.7 °F (35.9 °C) (Oral)   Resp 14   Ht 5' 6\" (1.676 m)   Wt 254 lb (115.2 kg)   SpO2 98%   BMI 41.00 kg/m²    Body mass index is 41 kg/m². Physical assessment  Physical Exam   Constitutional: She is oriented to person, place, and time and well-developed, well-nourished, and in no distress. Vital signs are normal.   HENT:   Head: Normocephalic and atraumatic. Cardiovascular: Normal rate, regular rhythm and normal heart sounds. Pulmonary/Chest: Effort normal and breath sounds normal.   Neurological: She is alert and oriented to person, place, and time. Gait normal.   Skin: Skin is warm, dry and intact. Nursing note and vitals reviewed.         Labwork and Ancillary Studies:    CBC w/Diff  Lab Results   Component Value Date/Time    WBC 7.2 01/07/2019 02:20 PM    HGB 12.9 01/07/2019 02:20 PM    PLATELET 516 (H) 83/68/6493 02:20 PM         Basic Metabolic Profile  Lab Results   Component Value Date/Time    Sodium 140 01/07/2019 02:20 PM    Potassium 4.5 01/07/2019 02:20 PM    Chloride 105 01/07/2019 02:20 PM    CO2 19 (L) 01/07/2019 02:20 PM    Glucose 88 01/07/2019 02:20 PM    BUN 12 01/07/2019 02:20 PM    Creatinine 0.69 01/07/2019 02:20 PM    BUN/Creatinine ratio 17 01/07/2019 02:20 PM    GFR est  01/07/2019 02:20 PM    GFR est non- 01/07/2019 02:20 PM    Calcium 8.9 01/07/2019 02:20 PM        Cholesterol  Lab Results   Component Value Date/Time    Cholesterol, total 206 (H) 01/07/2019 02:20 PM    HDL Cholesterol 40 01/07/2019 02:20 PM    LDL, calculated 134 (H) 01/07/2019 02:20 PM    Triglyceride 159 (H) 01/07/2019 02:20 PM       Health Maintenance:   Health Maintenance   Topic Date Due    Influenza Age 5 to Adult  12/01/2019 (Originally 8/1/2019)    DTaP/Tdap/Td series (1 - Tdap) 01/07/2020 (Originally 1/30/2013)    PAP AKA CERVICAL CYTOLOGY  05/29/2021    Pneumococcal 0-64 years Aged Out       I have discussed the diagnosis with the patient and the intended plan as seen in the above orders. The patient has received an After-Visit Summary and questions were answered concerning future plans. An After Visit Summary was printed and given to the patient. All diagnosis have been discussed with the patient and all of the patient's questions have been answered. Follow-up and Dispositions    · Return in about 1 year (around 11/18/2020) for Annual physical, w/o gyn. Eric Castelan, [facility]-BC  [address]  [address]

## 2019-11-19 LAB
25(OH)D3+25(OH)D2 SERPL-MCNC: 14.6 NG/ML (ref 30–100)
CHOLEST SERPL-MCNC: 212 MG/DL (ref 100–199)
HDLC SERPL-MCNC: 36 MG/DL
INTERPRETATION, 910389: NORMAL
LDLC SERPL CALC-MCNC: 135 MG/DL (ref 0–99)
TRIGL SERPL-MCNC: 205 MG/DL (ref 0–149)
VLDLC SERPL CALC-MCNC: 41 MG/DL (ref 5–40)

## 2019-11-27 RX ORDER — ASPIRIN 325 MG
50000 TABLET, DELAYED RELEASE (ENTERIC COATED) ORAL
Qty: 24 CAP | Refills: 0 | Status: SHIPPED | OUTPATIENT
Start: 2019-11-27 | End: 2019-12-06 | Stop reason: CLARIF

## 2019-11-27 NOTE — PROGRESS NOTES
Cholesterol and triglyceride levels are elevated, work on eating a low-fat heart healthy diet and exercise  Vitamin D level is low, prescription sent to pharmacy for 24 weeks of therapy

## 2019-12-02 NOTE — PROGRESS NOTES
Pt returned telephone call and was given lab results per NP [facility] ERICA SANCHEZ note.  Thank you

## 2019-12-06 ENCOUNTER — TELEPHONE (OUTPATIENT)
Dept: FAMILY MEDICINE CLINIC | Age: 29
End: 2019-12-06

## 2019-12-06 DIAGNOSIS — E55.9 VITAMIN D DEFICIENCY: Primary | ICD-10-CM

## 2019-12-06 RX ORDER — ERGOCALCIFEROL 1.25 MG/1
50000 CAPSULE ORAL
Qty: 24 CAP | Refills: 0 | Status: SHIPPED | OUTPATIENT
Start: 2019-12-06 | End: 2020-08-25 | Stop reason: SDUPTHER

## 2020-08-25 DIAGNOSIS — E55.9 VITAMIN D DEFICIENCY: ICD-10-CM

## 2020-08-25 DIAGNOSIS — J30.89 NON-SEASONAL ALLERGIC RHINITIS DUE TO OTHER ALLERGIC TRIGGER: ICD-10-CM

## 2020-08-25 DIAGNOSIS — A60.04 HERPES SIMPLEX VULVOVAGINITIS: ICD-10-CM

## 2020-08-25 RX ORDER — ERGOCALCIFEROL 1.25 MG/1
50000 CAPSULE ORAL
Qty: 24 CAP | Refills: 0 | Status: SHIPPED | OUTPATIENT
Start: 2020-08-25

## 2020-08-25 RX ORDER — VALACYCLOVIR HYDROCHLORIDE 500 MG/1
500 TABLET, FILM COATED ORAL 2 TIMES DAILY
Qty: 30 TAB | Refills: 3 | Status: SHIPPED | OUTPATIENT
Start: 2020-08-25 | End: 2020-09-14 | Stop reason: SDUPTHER

## 2020-08-25 RX ORDER — LEVOCETIRIZINE DIHYDROCHLORIDE 5 MG/1
5 TABLET, FILM COATED ORAL DAILY
Qty: 90 TAB | Refills: 3 | Status: SHIPPED | OUTPATIENT
Start: 2020-08-25 | End: 2020-09-14 | Stop reason: SDUPTHER

## 2020-09-14 ENCOUNTER — VIRTUAL VISIT (OUTPATIENT)
Dept: FAMILY MEDICINE CLINIC | Age: 30
End: 2020-09-14

## 2020-09-14 DIAGNOSIS — A60.04 HERPES SIMPLEX VULVOVAGINITIS: ICD-10-CM

## 2020-09-14 DIAGNOSIS — E55.9 VITAMIN D DEFICIENCY: ICD-10-CM

## 2020-09-14 DIAGNOSIS — K21.9 GASTROESOPHAGEAL REFLUX DISEASE, ESOPHAGITIS PRESENCE NOT SPECIFIED: ICD-10-CM

## 2020-09-14 DIAGNOSIS — J30.89 NON-SEASONAL ALLERGIC RHINITIS DUE TO OTHER ALLERGIC TRIGGER: ICD-10-CM

## 2020-09-14 DIAGNOSIS — E78.2 MIXED HYPERLIPIDEMIA: ICD-10-CM

## 2020-09-14 DIAGNOSIS — E78.2 MIXED HYPERLIPIDEMIA: Primary | ICD-10-CM

## 2020-09-14 RX ORDER — VALACYCLOVIR HYDROCHLORIDE 500 MG/1
500 TABLET, FILM COATED ORAL 2 TIMES DAILY
Qty: 30 TAB | Refills: 3 | Status: SHIPPED | OUTPATIENT
Start: 2020-09-14 | End: 2021-02-02 | Stop reason: SDUPTHER

## 2020-09-14 RX ORDER — ERGOCALCIFEROL 1.25 MG/1
50000 CAPSULE ORAL
Qty: 24 CAP | Refills: 0 | Status: CANCELLED | OUTPATIENT
Start: 2020-09-14

## 2020-09-14 RX ORDER — OMEPRAZOLE 20 MG/1
20 CAPSULE, DELAYED RELEASE ORAL DAILY
Qty: 90 CAP | Refills: 1 | Status: SHIPPED | OUTPATIENT
Start: 2020-09-14 | End: 2021-02-02 | Stop reason: SDUPTHER

## 2020-09-14 RX ORDER — LEVOCETIRIZINE DIHYDROCHLORIDE 5 MG/1
5 TABLET, FILM COATED ORAL DAILY
Qty: 90 TAB | Refills: 3 | Status: SHIPPED | OUTPATIENT
Start: 2020-09-14 | End: 2021-02-02 | Stop reason: SDUPTHER

## 2020-09-14 NOTE — PROGRESS NOTES
[address] [place] [address]               [phone]      Madeline Avina is a 31 y.o. female who was seen by synchronous (real-time) audio-video technology on 9/14/2020. Consent: Madeline Avina, who was seen by synchronous (real-time) audio-video technology, and/or her healthcare decision maker, is aware that this patient-initiated, Telehealth encounter on 9/14/2020 is a billable service, with coverage as determined by her insurance carrier. She is aware that she may receive a bill and has provided verbal consent to proceed: Yes. Assessment & Plan:   Diagnoses and all orders for this visit:    1. Mixed hyperlipidemia  -     LIPID PANEL; Future  Have her come in for labs to f/u lipid levels    2. Vitamin D deficiency  -     VITAMIN D, 25 HYDROXY; Future  She has been taking supplements  F/u levels    3. Non-seasonal allergic rhinitis due to other allergic trigger  -     levocetirizine (XYZAL) 5 mg tablet; Take 1 Tab by mouth daily. Refill provided    4. Herpes simplex vulvovaginitis  -     valACYclovir (VALTREX) 500 mg tablet; Take 1 Tab by mouth two (2) times a day. Has had some outbreaks, worst was when the quarantine started  Re-educated to take the valtrex as soon as she has any s/s of an outbreak for the best results    5. Gastroesophageal reflux disease, esophagitis presence not specified  -     omeprazole (PRILOSEC) 20 mg capsule; Take 1 Cap by mouth daily.  -     CBC W/O DIFF; Future  -     METABOLIC PANEL, COMPREHENSIVE; Future  Endorses s/s of gerd  rx for prilosec  F/u 3 months to evaluate effectiveness  Check labs    Follow-up and Dispositions    · Return for ASAP, Labs, AND, 3 months, GERD, 30 min, VV.             712  Subjective:   Madeline Avina is a 31 y.o. female who was seen for   Nasal Congestion; Cough;  Itchy Eye (Bilateral ); and GERD (Wants something for her Acid Reflux- not currently on anthing )    GERD  Patient complains of gastroesophageal reflux with abdominal bloating, belching, belching and eructation, bilious reflux, early satiety, heartburn, nocturnal burning and symptoms occur at night. Symptoms have been present for 1 years. She denies dysphagia. She  has not lost weight. She denies melena, hematochezia, hematemesis, and coffee ground emesis. She denies choking on food, cough, deep pressure at base of neck, difficulty swallowing, hematemesis, hoarseness, laryngitis, melena, odynophagia and shortness of breath. Medical therapy in the past has included antacids, tums. Had reflux as a baby and young child until about 12years old. Herpes simplex  Had an outbreak when the quarantine started but other wise has not had problems        Prior to Admission medications    Medication Sig Start Date End Date Taking? Authorizing Provider   levocetirizine (XYZAL) 5 mg tablet Take 1 Tab by mouth daily. 9/14/20  Yes Mitch Hernandes NP   valACYclovir (VALTREX) 500 mg tablet Take 1 Tab by mouth two (2) times a day. 9/14/20  Yes Mitch Hernandes NP   omeprazole (PRILOSEC) 20 mg capsule Take 1 Cap by mouth daily. 9/14/20  Yes Mitch Hernandes NP   copper (PARAGARD T 380A) 380 square mm IUD ParaGard T 380A   Yes Provider, Historical   ergocalciferol (ERGOCALCIFEROL) 1,250 mcg (50,000 unit) capsule Take 1 Cap by mouth every seven (7) days. 8/25/20   Mitch Hernandes NP   valACYclovir (VALTREX) 500 mg tablet Take 1 Tab by mouth two (2) times a day. 8/25/20 9/14/20  Mitch Hernandes NP   levocetirizine (XYZAL) 5 mg tablet Take 1 Tab by mouth daily. 8/25/20 9/14/20  Mitch Hernandes NP   fluticasone propionate (FLONASE) 50 mcg/actuation nasal spray 2 Sprays by Both Nostrils route daily.  4/8/19   Mitch Hernandes NP     Allergies   Allergen Reactions    Pollen Extracts Runny Nose       Patient Active Problem List   Diagnosis Code    Severe obesity ([address] E66.01    Family history of diabetes mellitus Z83.3    Genital herpes simplex A60.00    Hirsutism L68.0    Mixed hyperlipidemia E78.2    Gastroesophageal reflux disease K21.9    Vitamin D deficiency E55.9     Patient Active Problem List    Diagnosis Date Noted    Gastroesophageal reflux disease 09/14/2020    Vitamin D deficiency 09/14/2020    Mixed hyperlipidemia 11/18/2019    Family history of diabetes mellitus 04/08/2019    Genital herpes simplex 04/08/2019    Hirsutism 04/08/2019    Severe obesity ([address] 01/07/2019     Current Outpatient Medications   Medication Sig Dispense Refill    levocetirizine (XYZAL) 5 mg tablet Take 1 Tab by mouth daily. 90 Tab 3    valACYclovir (VALTREX) 500 mg tablet Take 1 Tab by mouth two (2) times a day. 30 Tab 3    omeprazole (PRILOSEC) 20 mg capsule Take 1 Cap by mouth daily. 90 Cap 1    copper (PARAGARD T 380A) 380 square mm IUD ParaGard T 380A      ergocalciferol (ERGOCALCIFEROL) 1,250 mcg (50,000 unit) capsule Take 1 Cap by mouth every seven (7) days. 24 Cap 0    fluticasone propionate (FLONASE) 50 mcg/actuation nasal spray 2 Sprays by Both Nostrils route daily. 1 Bottle 2     Allergies   Allergen Reactions    Pollen Extracts Runny Nose     Past Medical History:   Diagnosis Date    Anxiety     Depression     Herpes simplex type 1 infection      Past Surgical History:   Procedure Laterality Date    HX HERNIA REPAIR      HX WISDOM TEETH EXTRACTION       Family History   Problem Relation Age of Onset    Hypertension Mother    [facility] Cataract Mother     Schizophrenia Mother     Diabetes Father    [facility] Elevated Lipids Father     Hypertension Father     Cancer Paternal Grandmother         stomach    Diabetes Sister      Social History     Tobacco Use    Smoking status: Never Smoker    Smokeless tobacco: Never Used   Substance Use Topics    Alcohol use: Yes     Comment: Social       ROS  As stated in HPI, otherwise all others negative. Objective: There were no vitals taken for this visit.    General: alert, cooperative, no distress   Mental  status: normal mood, behavior, speech, dress, motor activity, and thought processes, able to follow commands   HENT: NCAT   Neck: no visualized mass   Resp: no respiratory distress   Neuro: no gross deficits   Skin: no discoloration or lesions of concern on visible areas   Psychiatric: normal affect, consistent with stated mood, no evidence of hallucinations     Additional exam findings: We discussed the expected course, resolution and complications of the diagnosis(es) in detail. Medication risks, benefits, costs, interactions, and alternatives were discussed as indicated. I advised her to contact the office if her condition worsens, changes or fails to improve as anticipated. She expressed understanding with the diagnosis(es) and plan. Timothy Sargent is a 31 y.o. female who was evaluated by a video visit encounter for concerns as above. Patient identification was verified prior to start of the visit. A caregiver was present when appropriate. Due to this being a TeleHealth encounter (During [place] [address] public [facility] emergency), evaluation of the following organ systems was limited: Vitals/Constitutional/EENT/Resp/CV/GI//MS/Neuro/Skin/Heme-Lymph-Imm. Pursuant to the emergency declaration under the [facility] waiver authority and the [employer] and [employer] Act, this Virtual  Visit was conducted, with patient's (and/or legal guardian's) consent, to reduce the patient's risk of exposure to COVID-19 and provide necessary medical care. Services were provided through a video synchronous discussion virtually to substitute for in-person clinic visit. Patient and provider were located at their individual homes. An After Visit Summary was printed and given to the patient. All diagnosis have been discussed with the patient and all of the patient's questions have been answered.      Follow-up and Dispositions    · Return for ASAP, Labs, AND, 3 months, GERD, 30 min, VV. Neetu Rodriguez, [address] [place].   Malik Dias

## 2020-09-14 NOTE — PROGRESS NOTES
Chief Complaint   Patient presents with    Nasal Congestion    Cough    Itchy Eye     Bilateral     GERD     Wants something for her Acid Reflux- not currently on anthing          1. Have you been to the ER, urgent care clinic since your last visit? Hospitalized since your last visit? No    2. Have you seen or consulted any other health care providers outside of the [address] [place] [address] since your last visit? Include any pap smears or colon screening.  No

## 2021-03-22 ENCOUNTER — VIRTUAL VISIT (OUTPATIENT)
Dept: FAMILY MEDICINE CLINIC | Age: 31
End: 2021-03-22
Payer: COMMERCIAL

## 2021-03-22 DIAGNOSIS — K21.9 GASTROESOPHAGEAL REFLUX DISEASE, UNSPECIFIED WHETHER ESOPHAGITIS PRESENT: Primary | ICD-10-CM

## 2021-03-22 DIAGNOSIS — E66.01 SEVERE OBESITY (HCC): ICD-10-CM

## 2021-03-22 DIAGNOSIS — J30.89 NON-SEASONAL ALLERGIC RHINITIS DUE TO OTHER ALLERGIC TRIGGER: ICD-10-CM

## 2021-03-22 DIAGNOSIS — A60.04 HERPES SIMPLEX VULVOVAGINITIS: ICD-10-CM

## 2021-03-22 PROCEDURE — 99213 OFFICE O/P EST LOW 20 MIN: CPT | Performed by: NURSE PRACTITIONER

## 2021-03-22 RX ORDER — OMEPRAZOLE 20 MG/1
20 CAPSULE, DELAYED RELEASE ORAL DAILY
Qty: 90 CAP | Refills: 0 | Status: CANCELLED | OUTPATIENT
Start: 2021-03-22

## 2021-03-22 RX ORDER — LEVOCETIRIZINE DIHYDROCHLORIDE 5 MG/1
5 TABLET, FILM COATED ORAL DAILY
Qty: 90 TAB | Refills: 3 | Status: SHIPPED | OUTPATIENT
Start: 2021-03-22

## 2021-03-22 RX ORDER — VALACYCLOVIR HYDROCHLORIDE 500 MG/1
500 TABLET, FILM COATED ORAL 2 TIMES DAILY
Qty: 60 TAB | Refills: 2 | Status: SHIPPED | OUTPATIENT
Start: 2021-03-22

## 2021-03-22 NOTE — PROGRESS NOTES
[address] [place] [address]               [phone]      Birgit Mcgraw is a 36 y.o. female who was seen by synchronous (real-time) audio-video technology on 3/22/2021. Consent: Birgit Mcgraw, who was seen by synchronous (real-time) audio-video technology, and/or her healthcare decision maker, is aware that this patient-initiated, Telehealth encounter on 3/22/2021 is a billable service, with coverage as determined by her insurance carrier. She is aware that she may receive a bill and has provided verbal consent to proceed: Yes. Assessment & Plan:   Diagnoses and all orders for this visit:    1. Gastroesophageal reflux disease, unspecified whether esophagitis present  Visit today to follow-up on her reflux, she endorses resolution of the symptoms that she had back in September  Will discontinue the omeprazole, educated patient that this is not a medication recommended for people to be on for long-term  Advised her if the symptoms were to return to please make a follow-up appointment with me and we will refer her to gastroenterology for further evaluation  She verbalized understanding    2. Herpes simplex vulvovaginitis  -     valACYclovir (VALTREX) 500 mg tablet; Take 1 Tab by mouth two (2) times a day. Refill provided    3. Non-seasonal allergic rhinitis due to other allergic trigger  -     levocetirizine (XYZAL) 5 mg tablet; Take 1 Tab by mouth daily. Refill provided    4. Severe obesity ([address]  Assessment & Plan:  Stable, based on history, physical exam and review of pertinent labs, studies and medications; meds reconciled; lifestyle modifications recommended. Reminded her to come in and get her lab work done, this was ordered in September 2020. She verbalized understanding    Follow-up and Dispositions    · Return for ASAP, Labs, AND as needed.              712  Subjective:     Health Maintenance Due   Topic Date Due    Hepatitis C Screening  Never done    DTaP/Tdap/Td series (1 - Tdap) Never done    Flu Vaccine (1) Never done    PAP AKA CERVICAL CYTOLOGY  05/29/2021             Reba Schwartz is a 36 y.o. female who was seen for   Follow-up (needs medication refill)    GERD  Last visit 9/2020:   GERD  Patient complains of gastroesophageal reflux with abdominal bloating, belching, belching and eructation, bilious reflux, early satiety, heartburn, nocturnal burning and symptoms occur at night. Symptoms have been present for 1 years. She denies dysphagia. She  has not lost weight. She denies melena, hematochezia, hematemesis, and coffee ground emesis. She denies choking on food, cough, deep pressure at base of neck, difficulty swallowing, hematemesis, hoarseness, laryngitis, melena, odynophagia and shortness of breath. Medical therapy in the past has included antacids, tums. Had reflux as a baby and young child until about 12years old. Today: endorses good response from omeprazole, occasionally with hiccoughs  Deneids: abd bloating, bilous reflux, symptoms worseing at night, n/v, black or bloody stools, choking on food, cough, deep pressure at base of neck, difficulty swallowing, hematemesis, hoarseness, laryngitis, melena, odynophagia and shortness of breath  Endorses: occasional belching, early satiety with certain foods-spicy foods, decreased heartburn    Prior to Admission medications    Medication Sig Start Date End Date Taking? Authorizing Provider   valACYclovir (VALTREX) 500 mg tablet Take 1 Tab by mouth two (2) times a day. 3/22/21  Yes Carrol Shown, NP   levocetirizine (XYZAL) 5 mg tablet Take 1 Tab by mouth daily. 3/22/21  Yes Carrol Shown, NP   omeprazole (PRILOSEC) 20 mg capsule Take 1 Cap by mouth daily. 2/2/21  Yes Carrol Shown, NP   valACYclovir (VALTREX) 500 mg tablet Take 1 Tab by mouth two (2) times a day. 2/2/21 3/22/21  Carrol Shown, NP   levocetirizine (XYZAL) 5 mg tablet Take 1 Tab by mouth daily.  2/2/21 3/22/21  Antoni Partida NP   ergocalciferol (ERGOCALCIFEROL) 1,250 mcg (50,000 unit) capsule Take 1 Cap by mouth every seven (7) days. 8/25/20   Antoni Partida NP   copper (PARAGARD T 380A) 380 square mm IUD ParaGard T 380A    Provider, Historical   fluticasone propionate (FLONASE) 50 mcg/actuation nasal spray 2 Sprays by Both Nostrils route daily. 4/8/19   Antoni Partida NP     Allergies   Allergen Reactions    Pollen Extracts Runny Nose       Patient Active Problem List   Diagnosis Code    Severe obesity ([facility] E66.01    Family history of diabetes mellitus Z83.3    Genital herpes simplex A60.00    Hirsutism L68.0    Mixed hyperlipidemia E78.2    Gastroesophageal reflux disease K21.9    Vitamin D deficiency E55.9     Past Surgical History:   Procedure Laterality Date    HX HERNIA REPAIR      HX WISDOM TEETH EXTRACTION       Family History   Problem Relation Age of Onset    Hypertension Mother    [facility] Cataract Mother     Schizophrenia Mother     Diabetes Father    [facility] Elevated Lipids Father     Hypertension Father     Cancer Paternal Grandmother         stomach    Diabetes Sister      Social History     Tobacco Use    Smoking status: Never Smoker    Smokeless tobacco: Never Used   Substance Use Topics    Alcohol use: Yes     Comment: Social       ROS  As stated in HPI, otherwise all others negative. Objective: There were no vitals taken for this visit. General: alert, cooperative, no distress   Mental  status: normal mood, behavior, speech, dress, motor activity, and thought processes, able to follow commands   HENT: NCAT   Neck: no visualized mass   Resp: no respiratory distress   Neuro: no gross deficits   Skin: no discoloration or lesions of concern on visible areas   Psychiatric: normal affect, consistent with stated mood, no evidence of hallucinations     Additional exam findings:        We discussed the expected course, resolution and complications of the diagnosis(es) in detail. Medication risks, benefits, costs, interactions, and alternatives were discussed as indicated. I advised her to contact the office if her condition worsens, changes or fails to improve as anticipated. She expressed understanding with the diagnosis(es) and plan. Tc Castellon is a 36 y.o. female who was evaluated by a video visit encounter for concerns as above. Patient identification was verified prior to start of the visit. A caregiver was present when appropriate. Due to this being a TeleHealth encounter (During [facility] public health emergency), evaluation of the following organ systems was limited: Vitals/Constitutional/EENT/Resp/CV/GI//MS/Neuro/Skin/Heme-Lymph-Imm. Pursuant to the emergency declaration under the [address] [place] [address], [facility] waiver authority and the [employer] and [employer] Act, this Virtual  Visit was conducted, with patient's (and/or legal guardian's) consent, to reduce the patient's risk of exposure to COVID-19 and provide necessary medical care. Services were provided through a video synchronous discussion virtually to substitute for in-person clinic visit. Patient and provider were located at their individual homes. An After Visit Summary was printed and given to the patient. All diagnosis have been discussed with the patient and all of the patient's questions have been answered. Follow-up and Dispositions    · Return for ASAP, Labs, AND as needed. More Yadav, [facility] [id number] [address]   Malik Dias

## 2021-03-22 NOTE — PROGRESS NOTES
Chief Complaint   Patient presents with    Follow-up     needs medication refill     1. Have you been to the ER, urgent care clinic since your last visit? Hospitalized since your last visit? No    2. Have you seen or consulted any other health care providers outside of the [address] [place] [address] since your last visit? Include any pap smears or colon screening.  No     Health Maintenance Due   Topic Date Due    Hepatitis C Screening  Never done    DTaP/Tdap/Td series (1 - Tdap) Never done    Flu Vaccine (1) Never done    PAP AKA CERVICAL CYTOLOGY  05/29/2021

## 2021-03-22 NOTE — ASSESSMENT & PLAN NOTE
Stable, based on history, physical exam and review of pertinent labs, studies and medications; meds reconciled; lifestyle modifications recommended.

## 2021-04-07 ENCOUNTER — TELEPHONE (OUTPATIENT)
Dept: FAMILY MEDICINE CLINIC | Age: 31
End: 2021-04-07

## 2021-04-07 DIAGNOSIS — K21.9 GASTROESOPHAGEAL REFLUX DISEASE, UNSPECIFIED WHETHER ESOPHAGITIS PRESENT: Primary | ICD-10-CM

## 2021-04-07 NOTE — TELEPHONE ENCOUNTER
Patient called stating her symptoms returned 3 days after NP Lena Tovar told her to stop taking omeprazole (PRILOSEC) 20 mg capsule.  Patient requesting a follow up call at [phone]

## 2021-04-07 NOTE — TELEPHONE ENCOUNTER
Returned call. Instructed to restart prilosec and will refer to GI.   Verbalized understanding and is in agreement with this plan of care

## 2021-06-29 DIAGNOSIS — K21.9 GASTROESOPHAGEAL REFLUX DISEASE: ICD-10-CM

## 2021-06-29 RX ORDER — OMEPRAZOLE 20 MG/1
CAPSULE, DELAYED RELEASE ORAL
Qty: 90 CAPSULE | Refills: 0 | OUTPATIENT
Start: 2021-06-29

## 2021-07-01 ENCOUNTER — TELEPHONE (OUTPATIENT)
Dept: FAMILY MEDICINE CLINIC | Age: 31
End: 2021-07-01

## 2021-07-01 NOTE — TELEPHONE ENCOUNTER
Patient state's \"my acid refluex medication cause's me not to have cycle's anymore. Since I started taking them on the 15th of April my cycle dose not come on and November of last year my cycle dose not come on. This past march when she had me stop that is when I had a cycle for about a week. \"

## 2021-07-01 NOTE — TELEPHONE ENCOUNTER
Patient has appointment on 10/4/21 need refill ASAP courtesy refill has been without since last Saturday

## 2021-07-01 NOTE — TELEPHONE ENCOUNTER
Patient called stating she would like to discuss the acid reflux medication.   Call her back at [phone]

## 2021-07-02 RX ORDER — OMEPRAZOLE 20 MG/1
20 CAPSULE, DELAYED RELEASE ORAL DAILY
Qty: 90 CAPSULE | Refills: 0 | Status: SHIPPED | OUTPATIENT
Start: 2021-07-02

## 2021-07-07 NOTE — TELEPHONE ENCOUNTER
Returned call. Started prilosec in September, she had no menstrual cycle in September or October, she did have a cycle in November while on prilosec, she stopped prilosec in march and had cycle in April, around 4/7 restarted prilosec due to persistent symptoms and referred to GI. She did not go to GI because she did not get a call. She stopped prilosec about 2 weeks ago and has since had a menstrual period. She has changed her diet and has been eating better foods. I advised her to use medications available OTC as needed such as Tums or Pepcid. Will send her the name and number of GI [facility] so she can call and make appointment. She verbalized understanding and is in agreement with this plan of care.
